# Patient Record
Sex: MALE | URBAN - METROPOLITAN AREA
[De-identification: names, ages, dates, MRNs, and addresses within clinical notes are randomized per-mention and may not be internally consistent; named-entity substitution may affect disease eponyms.]

---

## 2021-04-15 ENCOUNTER — HISTORICAL (OUTPATIENT)
Dept: ADMINISTRATIVE | Facility: HOSPITAL | Age: 46
End: 2021-04-15

## 2021-05-11 ENCOUNTER — HISTORICAL (OUTPATIENT)
Dept: ADMINISTRATIVE | Facility: HOSPITAL | Age: 46
End: 2021-05-11

## 2021-06-15 ENCOUNTER — HISTORICAL (OUTPATIENT)
Dept: ADMINISTRATIVE | Facility: HOSPITAL | Age: 46
End: 2021-06-15

## 2021-07-13 ENCOUNTER — HISTORICAL (OUTPATIENT)
Dept: ADMINISTRATIVE | Facility: HOSPITAL | Age: 46
End: 2021-07-13

## 2021-08-03 ENCOUNTER — HISTORICAL (OUTPATIENT)
Dept: ADMINISTRATIVE | Facility: HOSPITAL | Age: 46
End: 2021-08-03

## 2021-08-03 LAB — SARS-COV-2 RNA RESP QL NAA+PROBE: POSITIVE

## 2021-08-10 ENCOUNTER — HISTORICAL (OUTPATIENT)
Dept: INFECTIOUS DISEASES | Facility: HOSPITAL | Age: 46
End: 2021-08-10

## 2021-08-17 ENCOUNTER — HISTORICAL (OUTPATIENT)
Dept: ADMINISTRATIVE | Facility: HOSPITAL | Age: 46
End: 2021-08-17

## 2021-09-16 ENCOUNTER — HISTORICAL (OUTPATIENT)
Dept: LAB | Facility: HOSPITAL | Age: 46
End: 2021-09-16

## 2021-09-16 LAB
ABS NEUT (OLG): 4.37 X10(3)/MCL (ref 2.1–9.2)
ALBUMIN SERPL-MCNC: 3.6 GM/DL (ref 3.5–5)
ALBUMIN/GLOB SERPL: 0.9 RATIO (ref 1.1–2)
ALP SERPL-CCNC: 101 UNIT/L (ref 40–150)
ALT SERPL-CCNC: 25 UNIT/L (ref 0–55)
APPEARANCE, UA: CLEAR
AST SERPL-CCNC: 18 UNIT/L (ref 5–34)
BASOPHILS # BLD AUTO: 0.07 X10(3)/MCL (ref 0–0.2)
BASOPHILS NFR BLD AUTO: 0.9 % (ref 0–0.9)
BILIRUB SERPL-MCNC: 0.4 MG/DL (ref 0.2–1.2)
BILIRUB UR QL STRIP: NEGATIVE
BILIRUBIN DIRECT+TOT PNL SERPL-MCNC: 0.2 MG/DL (ref 0–0.5)
BILIRUBIN DIRECT+TOT PNL SERPL-MCNC: 0.2 MG/DL (ref 0–0.8)
BUN SERPL-MCNC: 16.8 MG/DL (ref 8.9–20.6)
CALCIUM SERPL-MCNC: 9.4 MG/DL (ref 8.4–10.2)
CHLORIDE SERPL-SCNC: 104 MMOL/L (ref 98–107)
CO2 SERPL-SCNC: 29 MMOL/L (ref 22–29)
COLOR UR: YELLOW
CREAT SERPL-MCNC: 0.76 MG/DL (ref 0.72–1.25)
EOSINOPHIL # BLD AUTO: 0.2 X10(3)/MCL (ref 0–0.9)
EOSINOPHIL NFR BLD AUTO: 2.5 % (ref 0–6.5)
ERYTHROCYTE [DISTWIDTH] IN BLOOD BY AUTOMATED COUNT: 12.6 % (ref 11.5–17)
EST. AVERAGE GLUCOSE BLD GHB EST-MCNC: 125.5 MG/DL
GLOBULIN SER-MCNC: 3.9 GM/DL (ref 2.4–3.5)
GLUCOSE (UA): NEGATIVE
GLUCOSE SERPL-MCNC: 124 MG/DL (ref 74–100)
HBA1C MFR BLD: 6 %
HCT VFR BLD AUTO: 37 % (ref 42–52)
HGB BLD-MCNC: 13.7 GM/DL (ref 14–18)
HGB UR QL STRIP: NEGATIVE
IMM GRANULOCYTES # BLD AUTO: 0.03 10*3/UL (ref 0–0.02)
IMM GRANULOCYTES NFR BLD AUTO: 0.4 % (ref 0–0.43)
KETONES UR QL STRIP: NEGATIVE
LEUKOCYTE ESTERASE UR QL STRIP: NEGATIVE
LYMPHOCYTES # BLD AUTO: 2.85 X10(3)/MCL (ref 0.6–4.6)
LYMPHOCYTES NFR BLD AUTO: 35.5 % (ref 16.2–38.3)
MCH RBC QN AUTO: 33.7 PG (ref 27–31)
MCHC RBC AUTO-ENTMCNC: 37 GM/DL (ref 33–36)
MCV RBC AUTO: 90.9 FL (ref 80–94)
MONOCYTES # BLD AUTO: 0.51 X10(3)/MCL (ref 0.1–1.3)
MONOCYTES NFR BLD AUTO: 6.4 % (ref 4.7–11.3)
MRSA SCREEN BY PCR: NEGATIVE
NEUTROPHILS # BLD AUTO: 4.37 X10(3)/MCL (ref 2.1–9.2)
NEUTROPHILS NFR BLD AUTO: 54.3 % (ref 49.1–73.4)
NITRITE UR QL STRIP: NEGATIVE
NRBC BLD AUTO-RTO: 0 % (ref 0–0.2)
PH UR STRIP: 6 [PH] (ref 5–7)
PLATELET # BLD AUTO: 221 X10(3)/MCL (ref 130–400)
PMV BLD AUTO: 10.3 FL (ref 7.4–10.4)
POTASSIUM SERPL-SCNC: 4.1 MMOL/L (ref 3.5–5.1)
PROT SERPL-MCNC: 7.5 GM/DL (ref 6.4–8.3)
PROT UR QL STRIP: NEGATIVE
RBC # BLD AUTO: 4.07 X10(6)/MCL (ref 4.7–6.1)
SODIUM SERPL-SCNC: 141 MMOL/L (ref 136–145)
SP GR UR STRIP: >=1.03 (ref 1–1.03)
UROBILINOGEN UR STRIP-ACNC: ABNORMAL
WBC # SPEC AUTO: 8 X10(3)/MCL (ref 4.5–11.5)

## 2021-09-21 ENCOUNTER — HISTORICAL (OUTPATIENT)
Dept: LAB | Facility: HOSPITAL | Age: 46
End: 2021-09-21

## 2021-09-21 LAB — SARS-COV-2 AG RESP QL IA.RAPID: NEGATIVE

## 2021-10-07 ENCOUNTER — HISTORICAL (OUTPATIENT)
Dept: ADMINISTRATIVE | Facility: HOSPITAL | Age: 46
End: 2021-10-07

## 2021-11-04 ENCOUNTER — HISTORICAL (OUTPATIENT)
Dept: ADMINISTRATIVE | Facility: HOSPITAL | Age: 46
End: 2021-11-04

## 2022-04-10 ENCOUNTER — HISTORICAL (OUTPATIENT)
Dept: ADMINISTRATIVE | Facility: HOSPITAL | Age: 47
End: 2022-04-10
Payer: COMMERCIAL

## 2022-04-24 VITALS
BODY MASS INDEX: 42.13 KG/M2 | DIASTOLIC BLOOD PRESSURE: 96 MMHG | OXYGEN SATURATION: 99 % | WEIGHT: 278 LBS | HEIGHT: 68 IN | SYSTOLIC BLOOD PRESSURE: 159 MMHG

## 2022-05-03 NOTE — HISTORICAL [facility]ER
This is a historical note converted from Gracie. Formatting and pictures may have been removed.  Please reference Gracie for original formatting and attached multimedia. Chief Complaint  Patient presents for POST OP left total hip 9/22/21-12/21/21. Patient denies having any pain at this time. Overall doing well. States doing therapy at home on his own.  History of Present Illness  46-year-old male presented to the clinic today 2 weeks status post a left total hip arthroplasty. ?Overall the patient is doing very well. ?The patient is ambulating without any assistance.? He denies any pain.? He states he does have tramadol at home and is barely using any and is taking Tylenol?as needed.? He would like a refill on her tramadol just to have on standby.? He is doing at home therapy?at this time.? He denies any?drainage or bleeding from the incision site. ?He did have a reaction?to the bandages?in which he finishes antibiotics that were sent in?to help calm this down.? The redness and irritation has improved?with this.  Review of Systems  Denies fevers, chills, chest pain, shortness of breath. Comprehensive review of systems performed and otherwise negative except as noted in HPI  Physical Exam  Vitals & Measurements  T:?98.1? ?F (Oral)? HR:?96(Peripheral)? BP:?132/81?  HT:?172.00?cm? WT:?126.100?kg? BMI:?42.62?  General: awake and alert, no acute distress, healthy appearing  ?Head and Neck: Head atraumatic/normocephalic. Moist MM  ?CV: brisk cap refill  ?Lungs: non-labored breathing, w/o cough or SOB  ?Skin: no rashes present, warm to touch  ?Neuro: sensation grossly intact distally  ?  Left hip exam:  Left hip incision clean dry and intact. No erythema, drainage, or signs of infection  ?No swelling distally. No signs of DVT  Brisk cap refill right ?foot  ?Sensation intact distally to right foot  ?Positive FHL/EHL/gastrocsoleus/tib ant  Assessment/Plan  1.?Aftercare following hip joint replacement surgery?Z47.1  ?Patient  presents to clinic today 2-week status post left total hip arthroplasty. ?Overall the patient is doing very well. ?His hip is stable upon examination and x-rays today here in clinic.? A prescription for tramadol was sent in today?to his pharmacy.? He was educated that if he needs more medication before his next appointment to call.? His staples were also removed today here in clinic where his incision site with?well-healed.? He was educated that he can now shower without vigorously scrubbing the incision site, and also to avoid fully submerging for the next few days.? We will have him follow-up in clinic in 1 month with x-rays to assess the status of his hip.  Ordered:  Clinic Follow up, *Est. 11/07/21 3:00:00 CST, Order for future visit, [facility]  Post-Op follow-up visit [id number] PC, Aftercare following hip joint replacement surgery, [facility], 10/07/21 11:02:00 CDT  XR Hip Left 2 Views, Routine, *Est. 11/07/21 3:00:00 CST, None, Ambulatory, Rad Type, Order for future visit, Aftercare following hip joint replacement surgery, Not Scheduled, *Est. 11/07/21 3:00:00 CST  ?  Orders:  traMADol, 50 mg = 1 tab(s), Oral, q6hr, PRN PRN pain, # 28 tab(s), 0 Refill(s), Pharmacy: [id number], 172, cm, Height/Length Dosing, 10/07/21 10:32:00 CDT, 126.1, kg, Weight Dosing, 10/07/21 10:32:00 CDT  The above findings, diagnostics, and treatment plan were discussed with Dr. Isaias Burgos who is in agreement with the plan of care.  Referrals  Clinic Follow up, *Est. 11/07/21 3:00:00 CST, Order for future visit, [facility]   Problem List/Past Medical History  Ongoing  Diabetes mellitus  Hypertension  Hypothyroid  Morbid obesity  Primary osteoarthritis of right hip  Historical  No qualifying data  Procedure/Surgical History  SHELBIE FERRARI Total Hip Arthroplasty (Left) (09/22/2021)  Replacement of Left Hip Joint  with Synthetic Substitute, Cemented, Open Approach (09/22/2021)  SHELBIE FERRARI Total Hip Arthroplasty (Right) (05/26/2021)  Replacement of Right Hip Joint with Synthetic Substitute, Cemented, Open Approach (05/26/2021)   Medications  Adderall 30 mg oral tablet, 30 mg= 1 tab(s), Oral, BID,? ?Still taking, not as prescribed: takes as needed, for weight loss  albuterol CFC free 90 mcg/inh inhalation aerosol with adapter, 2 puff(s), INH, q6hr,? ?Not taking  AMLODIPINE BESYLATE 5MG TABLET, 5 mg= 1 tab(s), Oral, At Bedtime  aspirin 81 mg oral Delayed Release (EC) tablet, 81 mg= 1 tab(s), Oral, BID  Bactrim  mg-160 mg oral tablet, 1 tab(s), Oral, BID,? ?Not Taking, Completed Rx  buprenorphine 2 mg sublingual tablet, 1 mg= 0.5 tab(s), SL, TID,? ?Still taking, not as prescribed: PRN  dexamethasone 2 mg oral tablet, 2 mg= 1 tab(s), Oral, qPM,? ?Not taking  dextromethorphan-promethazine 15 mg-6.25 mg/5 mL oral syrup, 5 mL, Oral, q6hr,? ?Not taking  glipiZIDE 5 mg oral tablet, 5 mg= 1 tab(s), Oral, BID  ibuprofen 800 mg oral tablet, 800 mg= 1 tab(s), Oral, BID,? ?Not taking  levothyroxine 200 mcg (0.2 mg) oral tablet, 200 mcg= 1 tab(s), Oral, Daily  lisinopril 20 mg oral tablet, 20 mg= 1 tab(s), Oral, qPM,? ?Not taking  lisinopril 30 mg oral tablet, 30 mg= 1 tab(s), Oral, qPM  METOPROLOL SUCCINATE ER 50MG TABLET EXTENDED RELEASE 24 HOUR, 50 mg= 1 tab(s), Oral, At Bedtime  Neurontin 300 mg oral capsule, 300 mg= 1 cap(s), Oral, BID  nystatin 100,000 units/mL oral suspension, 911269 units, Oral, QID,? ?Not taking  ondansetron 4 mg oral tablet, disintegrating, 4 mg= 1 tab(s), Oral, q8hr,? ?Not taking  polyethylene glycol 3350 oral powder for reconstitution, 17 gm= 17 gm, TOP, Daily,? ?Not taking  Robaxin 750 mg oral tablet, 750 mg= 1 tab(s), Oral, q6hr, PRN,? ?Not taking  Toradol 10 mg oral tablet, 10 mg= 1 tab(s), Oral, q8hr,? ?Not taking  traMADol 50 mg oral tablet, 50 mg= 1 tab(s), Oral, q4hr,? ?Still taking, not as prescribed:  PRN  Tylenol, 500 mg= 1 tab(s), Oral, q4hr,? ?Not taking  Allergies  Adhesive Bandage?(Burning of skin)  No Known Medication Allergies  Social History  Abuse/Neglect  No, No, Yes, 10/07/2021  Alcohol  Never, 10/07/2021  Employment/School  Employed, 09/22/2021  Home/Environment  Lives with Spouse., 09/22/2021  Nutrition/Health  Diabetic, 09/22/2021  Substance Use  Never, 10/07/2021  Tobacco  Former smoker, quit more than 30 days ago, No, 10/07/2021  Family History  Cancer: Mother.  Health Maintenance  Health Maintenance  ???Pending?(in the next year)  ??? ??Due?  ??? ? ? ?Diabetes Maintenance-Eye Exam due??10/07/21??Unknown Frequency  ??? ? ? ?Diabetes Maintenance-Fasting Lipid Profile due??10/07/21??Variable frequency  ??? ? ? ?Diabetes Maintenance-Foot Exam due??10/07/21??Unknown Frequency  ??? ? ? ?Hypertension Management-Education due??10/07/21??and every 1??year(s)  ??? ? ? ?Lipid Screening due??10/07/21??Unknown Frequency  ??? ? ? ?Tetanus Vaccine due??10/07/21??and every 10??year(s)  ??? ??Due In Future?  ??? ? ? ?Obesity Screening not due until??01/01/22??and every 1??year(s)  ??? ? ? ?Alcohol Misuse Screening not due until??01/02/22??and every 1??year(s)  ??? ? ? ?ADL Screening not due until??06/15/22??and every 1??year(s)  ??? ? ? ?Diabetes Maintenance-HgbA1c not due until??09/16/22??and every 1??year(s)  ??? ? ? ?Hypertension Management-BMP not due until??09/23/22??and every 1??year(s)  ??? ? ? ?Diabetes Maintenance-Serum Creatinine not due until??09/23/22??and every 1??year(s)  ??? ? ? ?Aspirin Therapy for CVD Prevention not due until??09/23/22??and every 1??year(s)  ???Satisfied?(in the past 1 year)  ??? ??Satisfied?  ??? ? ? ?ADL Screening on??06/15/21.??Satisfied by Antonio Christopher  ??? ? ? ?Alcohol Misuse Screening on??06/15/21.??Satisfied by Antonio Christopher  ??? ? ? ?Aspirin Therapy for CVD Prevention on??09/23/21.??Satisfied by Violette Earl NP  ??? ? ? ?Blood Pressure Screening  on??10/07/21.??Satisfied by Maribel Mayfield  ??? ? ? ?Body Mass Index Check on??10/07/21.??Satisfied by Maribel Mayfield  ??? ? ? ?Depression Screening on??10/07/21.??Satisfied by Maribel Mayfield  ??? ? ? ?Diabetes Maintenance-Serum Creatinine on??09/23/21.??Satisfied by Rachel Mattson  ??? ? ? ?Diabetes Screening on??09/23/21.??Satisfied by Rachel Mattson  ??? ? ? ?Hypertension Management-Blood Pressure on??10/07/21.??Satisfied by Maribel Mayfield  ??? ? ? ?Influenza Vaccine on??10/07/21.??Satisfied by Maribel Mayfield  ??? ? ? ?Obesity Screening on??10/07/21.??Satisfied by Maribel Mayfield  ?  Diagnostic Results  AP &?lateral?left hip?reviewed. ?Patients implants well fixed with no signs of loosening or subsidence noted.

## 2022-05-03 NOTE — HISTORICAL [facility]
This is a historical note converted from Gracie. Formatting and pictures may have been removed.  Please reference Gracie for original formatting and attached multimedia. Chief Complaint  Right side hip pain,no surgery. pt states he is also having siatica pain on left side. 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  History of Present Illness  45-year-old male presents today for evaluation of right hip pain. ?Patient with?a history of hip pain for the last 10 to 15 years. ?He was told about 10 years ago the need hip replacement.? He has taken anti-inflammatories for this?routinely. ?He uses a walker and a cane for ambulation. ?Notes pain to his posterior buttock. ?Denies any groin pain. ?Is worse with any ambulation. ?Also with longstanding history of left hip pain as well.? He feels as though is reached a point disability guards to the right hip?and like to proceed with surgical intervention.  Review of Systems  Denies fevers, chills, chest pain, shortness of breath. Comprehensive review of systems performed and otherwise negative except as noted in HPI.  Physical Exam  Vitals & Measurements  T:?96.2? ?F (Oral)? HR:?87(Peripheral)? BP:?146/90?  HT:?176.00?cm? WT:?127.000?kg? BMI:?41?  General: No acute distress, alert and oriented, healthy appearing?  HEENT: Head is atraumatic, mucous membranes are moist?  Cardiovascular: Brisk capillary refill  Lungs: Breathing non-labored?  Skin: no rashes appreciated?  Neurologic: Sensation is grossly intact distally  ?  Right hip:  Patient with a fixed?external rotation deformity. ?He cannot rotate to neutral. ?He does have about?30 degrees of external rotation. ?Internal rotation is -10. ?Hip flexion?to 100. ?Further limited by pain. ?He has a positive [facility] recreation of his buttock pain. ?He walks with a severely antalgic gait.  Assessment/Plan  1.?Primary osteoarthritis of right hip?M16.11  At this point the patient is tried and failed all conservative management with  regards to their right hip. ?They have tried and failed nonoperative management including: Anti-inflammatories, activity modification, and assistive devices. All of these have failed to completely remove their pain. They have pain putting on shoes and socks, getting in and out of a car, as well as walking on level ground. Their hip pain is affecting activities of daily living They feel that theyve reached a point of disability with regards to their hip. X-rays reveal advanced, end-stage degenerative osteoarthritis as noted by subchondral sclerosis, joint space narrowing, and periarticular osteophytes. The patient would like to proceed with surgical intervention and would be a good candidate for total hip replacement.  Total hip arthroplasty procedure, alternatives, risks, and benefits were discussed in detail. The risks including but not limited to: infection, need for revision surgery, pain, swelling, loosening, injury to surrounding neurovascular structures, stiffness, incomplete resolution of pain, limb length discrepancy, DVT, PE, and death were discussed in detail. Despite these risks, the patient would like to proceed with surgical intervention. All questions were answered, no guarantees made. Will plan for right TRUONG on mid May.  Ordered:  CT Ext Lower Robotic Right, Routine, 04/15/21 16:38:00 CDT, Other (please specify), None, Ambulatory, pre-op planning right TRUONG, Rad Type, Order for future visit, Primary osteoarthritis of right hip, Schedule this test, [facility], 04/15/21 16:38:00 CDT  Office/Outpatient Visit Level 4 New [id number] PC, Primary osteoarthritis of right hip, [facility], 04/15/21 16:34:00 CDT  Office/Outpatient Visit Level 4 New [id number] PC, Primary osteoarthritis of right hip  Right hip pain, [facility], 04/15/21 16:20:00 CDT  ?  Referrals  Clinic Follow up, Order for future visit   Problem List/Past Medical History  Ongoing  Hypertension  Morbid  obesity  Historical  No qualifying data  Medications  AMLODIPINE BESYLATE 5MG TABLET, 5 mg= 1 tab(s), Oral, Daily  CELECOXIB 100MG CAPSULE  LISINOPRIL 20MG TABLET, 20 mg= 1 tab(s), Oral, Daily  METHOCARBAMOL 750MG TABLET  METOPROLOL SUCCINATE ER 50MG TABLET EXTENDED RELEASE 24 HOUR, 50 mg= 1 tab(s), Oral, Daily  Allergies  No Known Medication Allergies  Social History  Abuse/Neglect  No, No, Yes, 04/15/2021  Tobacco  Never (less than 100 in lifetime), N/A, 04/15/2021  Health Maintenance  Health Maintenance  ???Pending?(in the next year)  ??? ??OverDue  ??? ? ? ?Alcohol Misuse Screening due??01/02/21??and every 1??year(s)  ??? ??Due?  ??? ? ? ?ADL Screening due??04/15/21??and every 1??year(s)  ??? ? ? ?Aspirin Therapy for CVD Prevention due??04/15/21??and every 1??year(s)  ??? ? ? ?Hypertension Management-Education due??04/15/21??and every 1??year(s)  ??? ? ? ?Tetanus Vaccine due??04/15/21??and every 10??year(s)  ??? ??Due In Future?  ??? ? ? ?Obesity Screening not due until??01/01/22??and every 1??year(s)  ???Satisfied?(in the past 1 year)  ??? ??Satisfied?  ??? ? ? ?Blood Pressure Screening on??04/15/21.??Satisfied by Stephanie Rees  ??? ? ? ?Body Mass Index Check on??04/15/21.??Satisfied by Stephanie Rees  ??? ? ? ?Depression Screening on??04/15/21.??Satisfied by Stephanie Rees  ??? ? ? ?Hypertension Management-Blood Pressure on??04/15/21.??Satisfied by Stephanie Rees  ??? ? ? ?Obesity Screening on??04/15/21.??Satisfied by Stephanie Rees  ?  Diagnostic Results  AP lateral hip reviewed. ?Patient with end-stage arthritis with loss of joint space and bone of articulation to right as well as left hip. ?Patient has?complete obliteration of the right hip joint with bone loss of his femoral head.

## 2022-05-03 NOTE — HISTORICAL [facility] CERNER
This is a historical note converted from Gracie. Formatting and pictures may have been removed.  Please reference Gracie for original formatting and attached multimedia. Chief Complaint  1m f/u R TRUONG 5/26/21, pt states his hip is doing well, denies pain, no changes from last visit  History of Present Illness  46-year-old male presents to clinic today?6 weeks status post right total hip arthroplasty. ?Overall he is doing very well and ambulating?any assistance. ?He denies any pain in the right hip.? He is done with physical therapy.? He has no complications to the right hip at this time.? Regards to the left hip he does have significant pain?with ambulation.? His pain is more located in the groin area.? He does note that x-rays have shown?end-stage arthritis to the left hip.? He has tried?over-the-counter anti-inflammatories, Tylenol, and lifestyle modifications.? He does feel as though he is reached a point of disability to wear?he would like to proceed with a left total hip arthroplasty.  Review of Systems  Denies fevers, chills, chest pain, shortness of breath. Comprehensive review of systems performed and otherwise negative except as noted in HPI  Physical Exam  Vitals & Measurements  T:?36.6? ?C (Oral)? HR:?97(Peripheral)? BP:?152/94?  HT:?172.00?cm? WT:?124.400?kg? BMI:?42.05?  General: awake and alert, no acute distress, healthy appearing  ?Head and Neck: Head atraumatic/normocephalic. Moist MM  ?CV: brisk cap refill  ?Lungs: non-labored breathing, w/o cough or SOB  ?Skin: no rashes present, warm to touch  ?Neuro: sensation grossly intact distally  ?  Right hip exam:  Right hip incision clean dry and intact. No erythema, drainage, or signs of infection  ?No swelling distally. No signs of DVT  Brisk cap refill right ?foot  ?Sensation intact distally to right foot  ?Positive FHL/EHL/gastrocsoleus/tib ant?  ?  Focused Orthopedic Exam:  Left hip without wound or skin breakdown.?  No tenderness to palpation about  the greater trochanter and gluteus?  ?Flexion to 50. ?Internal Rotation to 0. ?External Rotation to 15. ?Significant groin pain with range of motion  Positive Stinchfield  Positive antalgic gait  ?  Assessment/Plan  1.?Aftercare following joint replacement?Z47.1  ?Patient 6 weeks status post right total hip arthroplasty.? Overall he is doing excellent and has no pain or complications to the right hip. ?His hip is stable upon examination and x-rays here today in clinic.  2.?Primary osteoarthritis of left hip?M16.12  ?  At this point the patient is tried and failed all conservative management with regards to their left hip. ?They have tried and failed nonoperative management including: Anti-inflammatories, activity modification, and assistive devices. All of these have failed to completely remove their pain. They have pain putting on shoes and socks, getting in and out of a car, as well as walking on level ground. Their hip pain is affecting activities of daily living They feel that theyve reached a point of disability with regards to their hip. X-rays reveal advanced, end-stage degenerative osteoarthritis as noted by subchondral sclerosis, joint space narrowing, and periarticular osteophytes. The patient would like to proceed with surgical intervention and would be a good candidate for total hip replacement. ?We will have him pick a date today here in clinic, and have him follow-up for a preoperative visit.   Problem List/Past Medical History  Ongoing  Diabetes mellitus  Hypertension  Morbid obesity  Primary osteoarthritis of right hip  Historical  No qualifying data  Procedure/Surgical History  SHELBIEART FERRARI Total Hip Arthroplasty (Right) (05/26/2021)  Replacement of Right Hip Joint with Synthetic Substitute, Cemented, Open Approach (05/26/2021)   Medications  Adderall 30 mg oral tablet, 30 mg= 1 tab(s), Oral, BID  AMLODIPINE BESYLATE 5MG TABLET, 5 mg= 1 tab(s), Oral, Daily  aspirin 81 mg oral Delayed Release (EC)  tablet, 81 mg= 1 tab(s), Oral, BID  buprenorphine 2 mg sublingual tablet, 1 mg= 0.5 tab(s), SL, TID  glipiZIDE 5 mg oral tablet, 5 mg= 1 tab(s), Oral, BID  levothyroxine 200 mcg (0.2 mg) oral tablet, 200 mcg= 1 tab(s), Oral, Daily  LISINOPRIL 20MG TABLET, 20 mg= 1 tab(s), Oral, Daily  METOPROLOL SUCCINATE ER 50MG TABLET EXTENDED RELEASE 24 HOUR, 50 mg= 1 tab(s), Oral, Daily  Neurontin 300 mg oral capsule, 300 mg= 1 cap(s), Oral, BID  Tylenol, 500 mg= 1 tab(s), Oral, q4hr  Allergies  No Known Medication Allergies  Social History  Abuse/Neglect  No, No, Yes, 07/13/2021  Alcohol  Past, 05/11/2021  Substance Use  Never, 05/11/2021  Tobacco  Former smoker, quit more than 30 days ago, No, 07/13/2021  Family History  Family history is negative  Health Maintenance  Health Maintenance  ???Pending?(in the next year)  ??? ??OverDue  ??? ? ? ?Influenza Vaccine due??10/01/20??and every 1??day(s)  ??? ??Due?  ??? ? ? ?Diabetes Maintenance-Eye Exam due??07/13/21??Unknown Frequency  ??? ? ? ?Diabetes Maintenance-Fasting Lipid Profile due??07/13/21??Variable frequency  ??? ? ? ?Diabetes Maintenance-Foot Exam due??07/13/21??Unknown Frequency  ??? ? ? ?Diabetes Maintenance-HgbA1c due??07/13/21??Unknown Frequency  ??? ? ? ?Hypertension Management-Education due??07/13/21??and every 1??year(s)  ??? ? ? ?Lipid Screening due??07/13/21??Unknown Frequency  ??? ? ? ?Tetanus Vaccine due??07/13/21??and every 10??year(s)  ??? ??Due In Future?  ??? ? ? ?Obesity Screening not due until??01/01/22??and every 1??year(s)  ??? ? ? ?Alcohol Misuse Screening not due until??01/02/22??and every 1??year(s)  ??? ? ? ?Hypertension Management-BMP not due until??05/27/22??and every 1??year(s)  ??? ? ? ?Diabetes Maintenance-Serum Creatinine not due until??05/27/22??and every 1??year(s)  ??? ? ? ?Aspirin Therapy for CVD Prevention not due until??05/27/22??and every 1??year(s)  ??? ? ? ?ADL Screening not due until??06/15/22??and every 1??year(s)  ???Satisfied?(in  the past 1 year)  ??? ??Satisfied?  ??? ? ? ?ADL Screening on??06/15/21.??Satisfied by Antonio Christopher  ??? ? ? ?Alcohol Misuse Screening on??06/15/21.??Satisfied by Antonio Christopher  ??? ? ? ?Aspirin Therapy for CVD Prevention on??05/27/21.??Satisfied by Violette Earl NP  ??? ? ? ?Blood Pressure Screening on??07/13/21.??Satisfied by Antonio Christopher  ??? ? ? ?Body Mass Index Check on??07/13/21.??Satisfied by Antonio Christopher  ??? ? ? ?Depression Screening on??07/13/21.??Satisfied by Antonio Christopher  ??? ? ? ?Diabetes Maintenance-Serum Creatinine on??05/27/21.??Satisfied by Chaparro Jean  ??? ? ? ?Diabetes Screening on??05/27/21.??Satisfied by Chaparro Jean  ??? ? ? ?Hypertension Management-Blood Pressure on??07/13/21.??Satisfied by Antonio Christopher  ??? ? ? ?Obesity Screening on??07/13/21.??Satisfied by Antonio Christopher  ?  Diagnostic Results  AP lateral?right hip?reviewed. ?Patients implants appear well fixed. ?No signs of loosening or subsidence noted.

## 2022-05-03 NOTE — HISTORICAL [facility] CERNER
This is a historical note converted from Gracie. Formatting and pictures may have been removed.  Please reference Gracie for original formatting and attached multimedia. Chief Complaint  Patient presents for Pre Op Right total hip 5/26/21. Patient admits to having a constant achy/sharp pain. States movement make pain intensify. Taking buprenorphine given to him by Dr Madrigal. Pain scale is a 10/10 right now due to walking.  History of Present Illness  45-year-old male presents here with follow-up of?right?hip osteoarthritis. ?Patient ingested pain in the right hip. ?He is trying for long-term medical and anti-inflammatories activity modifications will use a cane.? Patient feels he would respond to smooth guards the right hip and like to proceed with surgical invention.? Is on chronic pain medication from his pain management physician.  Review of Systems  Denies fevers, chills, chest pain, shortness of breath. Comprehensive review of systems performed and otherwise negative except as noted in HPI.  Physical Exam  Vitals & Measurements  T:?98.2? ?F (Oral)? HR:?98(Peripheral)? BP:?123/80?  HT:?176.00?cm? WT:?127.000?kg? BMI:?41?  General: No acute distress, alert and oriented, healthy appearing?  HEENT: Head is atraumatic, mucous membranes are moist?  Cardiovascular: Brisk capillary refill  Lungs: Breathing non-labored?  Skin: no rashes appreciated?  Neurologic: Sensation is grossly intact distally  ?  Right hip:  Patient significant pain with range of motion. ?He walks an antalgic gait. ?Positive Stinchfield. ?Brisk cap refill distally.  Assessment/Plan  1.?Primary osteoarthritis of right hip?M16.11  At this point the patient is tried and failed all conservative management with regards to their right hip. ?They have tried and failed nonoperative management including: Anti-inflammatories, activity modification, and assistive devices. All of these have failed to completely remove their pain. They have pain putting on  shoes and socks, getting in and out of a car, as well as walking on level ground. Their hip pain is affecting activities of daily living They feel that theyve reached a point of disability with regards to their hip. X-rays reveal advanced, end-stage degenerative osteoarthritis as noted by subchondral sclerosis, joint space narrowing, and periarticular osteophytes. The patient would like to proceed with surgical intervention and would be a good candidate for total hip replacement.  Total hip arthroplasty procedure, alternatives, risks, and benefits were discussed in detail. The risks including but not limited to: infection, need for revision surgery, pain, swelling, loosening, injury to surrounding neurovascular structures, stiffness, incomplete resolution of pain, limb length discrepancy, DVT, PE, and death were discussed in detail. Despite these risks, the patient would like to proceed with surgical intervention. All questions were answered, no guarantees made. Will plan for right TRUONG on 5/26/2021.?  Patient does have a history of chronic narcotic use.? Expect the patient to take greater than 48 hours?to appropriately manage his pain with multimodal pain medication.? Will perform surgery as an inpatient?due to this.  Ordered:  XR Spine Lumbar 2 or 3 Views, Routine, 05/11/21 9:33:00 CDT, None, Ambulatory, Rad Type, Primary osteoarthritis of right hip, Not Scheduled, 05/11/21 9:33:00 CDT  ?   Problem List/Past Medical History  Ongoing  Hypertension  Morbid obesity  Primary osteoarthritis of right hip  Historical  No qualifying data  Medications  Adderall 30 mg oral tablet, 30 mg= 1 tab(s), Oral, BID  AMLODIPINE BESYLATE 5MG TABLET, 5 mg= 1 tab(s), Oral, Daily  buprenorphine 2 mg sublingual tablet, 1 mg= 0.5 tab(s), SL, TID  CELECOXIB 100MG CAPSULE,? ?Not taking  glipiZIDE 5 mg oral tablet, 5 mg= 1 tab(s), Oral, BID  ibuprofen 800 mg oral tablet, 800 mg= 1 tab(s), Oral, BID  levothyroxine 200 mcg (0.2 mg) oral tablet,  200 mcg= 1 tab(s), Oral, Daily  LISINOPRIL 20MG TABLET, 20 mg= 1 tab(s), Oral, Daily  METHOCARBAMOL 750MG TABLET  METOPROLOL SUCCINATE ER 50MG TABLET EXTENDED RELEASE 24 HOUR, 50 mg= 1 tab(s), Oral, Daily  Allergies  No Known Medication Allergies  Social History  Abuse/Neglect  No, No, Yes, 04/15/2021  Alcohol  Past, 05/11/2021  Substance Use  Never, 05/11/2021  Tobacco  Former smoker, quit more than 30 days ago, No, 05/11/2021  Family History  Family history is negative  Health Maintenance  Health Maintenance  ???Pending?(in the next year)  ??? ??OverDue  ??? ? ? ?Influenza Vaccine due??10/01/20??and every 1??day(s)  ??? ? ? ?Alcohol Misuse Screening due??01/02/21??and every 1??year(s)  ??? ??Due?  ??? ? ? ?ADL Screening due??05/11/21??and every 1??year(s)  ??? ? ? ?Aspirin Therapy for CVD Prevention due??05/11/21??and every 1??year(s)  ??? ? ? ?Diabetes Maintenance-Eye Exam due??05/11/21??Unknown Frequency  ??? ? ? ?Diabetes Maintenance-Foot Exam due??05/11/21??Unknown Frequency  ??? ? ? ?Diabetes Maintenance-HgbA1c due??05/11/21??Unknown Frequency  ??? ? ? ?Diabetes Screening due??05/11/21??Unknown Frequency  ??? ? ? ?Hypertension Management-BMP due??05/11/21??Unknown Frequency  ??? ? ? ?Hypertension Management-Education due??05/11/21??and every 1??year(s)  ??? ? ? ?Lipid Screening due??05/11/21??Unknown Frequency  ??? ? ? ?Tetanus Vaccine due??05/11/21??and every 10??year(s)  ??? ??Due In Future?  ??? ? ? ?Obesity Screening not due until??01/01/22??and every 1??year(s)  ???Satisfied?(in the past 1 year)  ??? ??Satisfied?  ??? ? ? ?Blood Pressure Screening on??05/11/21.??Satisfied by Maribel Mayfield  ??? ? ? ?Body Mass Index Check on??05/11/21.??Satisfied by Maribel Mayfield  ??? ? ? ?Depression Screening on??05/11/21.??Satisfied by Maribel Mayfield  ??? ? ? ?Hypertension Management-Blood Pressure on??05/11/21.??Satisfied by Maribel Mayfield  ??? ? ? ?Obesity Screening on??05/11/21.??Satisfied by Maribel Mayfield  CHRISTIANO.  ?

## 2022-05-03 NOTE — HISTORICAL [facility] C[facility]
This is a historical note converted from Gracie. Formatting and pictures may have been removed.  Please reference Gracie for original formatting and attached multimedia. Chief Complaint  2W POSTOP R TRUONG 5/26/21, pt states his hip is doing well, pain level a 2, taking tylenol for pain, xr today  History of Present Illness  46-year-old male presents here for follow-up of total on the right side. ?Patient is?2 weeks out. ?Using a cane. ?Back at work. ?On Tylenol as well as his chronic?pain medication. ?Overall happy with his recovery and feels much relief of his symptoms.  Review of Systems  Denies fevers, chills, chest pain, shortness of breath. Comprehensive review of systems performed and otherwise negative except as noted in HPI.  Physical Exam  Vitals & Measurements  T:?36.7? ?C (Oral)? HR:?91(Peripheral)? BP:?127/76?  HT:?172.00?cm? WT:?124.400?kg? BMI:?42.05?  General: No acute distress, alert and oriented, healthy appearing?  HEENT: Head is atraumatic, mucous membranes are moist?  Cardiovascular: Brisk capillary refill  Lungs: Breathing non-labored?  Skin: no rashes appreciated?  Neurologic: Sensation is grossly intact distally  ?  Hip exam:  Right?hip incision clean dry and intact. No erythema, drainage, or signs of infection  No swelling distally. No signs of DVT  No pain with logroll  Sensation intact distally to right foot  Positive FHL/EHL/gastrocsoleus/tib ant brisk capillary refill right foot  ?  Assessment/Plan  1.?Aftercare following joint replacement?Z47.1  ?Patient overall doing excellent following up arthroplasty. ?We will discontinue staples today. ?Continue physical therapy for gait training range of motion strengthening. ?Plan to see him back in a month.   Problem List/Past Medical History  Ongoing  Diabetes mellitus  Hypertension  Morbid obesity  Primary osteoarthritis of right hip  Historical  No qualifying data  Procedure/Surgical History  [facility] Total Hip Arthroplasty (Right)  (05/26/2021)  Replacement of Right Hip Joint with Synthetic Substitute, Cemented, Open Approach (05/26/2021)   Medications  Adderall 30 mg oral tablet, 30 mg= 1 tab(s), Oral, BID  AMLODIPINE BESYLATE 5MG TABLET, 5 mg= 1 tab(s), Oral, Daily  aspirin 81 mg oral Delayed Release (EC) tablet, 81 mg= 1 tab(s), Oral, BID  buprenorphine 2 mg sublingual tablet, 1 mg= 0.5 tab(s), SL, TID  glipiZIDE 5 mg oral tablet, 5 mg= 1 tab(s), Oral, BID  levothyroxine 200 mcg (0.2 mg) oral tablet, 200 mcg= 1 tab(s), Oral, Daily  LISINOPRIL 20MG TABLET, 20 mg= 1 tab(s), Oral, Daily  METOPROLOL SUCCINATE ER 50MG TABLET EXTENDED RELEASE 24 HOUR, 50 mg= 1 tab(s), Oral, Daily  Neurontin 300 mg oral capsule, 300 mg= 1 cap(s), Oral, BID  Tylenol, 500 mg= 1 tab(s), Oral, q4hr  Allergies  No Known Medication Allergies  Social History  Abuse/Neglect  No, No, Yes, 06/15/2021  Alcohol  Past, 05/11/2021  Substance Use  Never, 05/11/2021  Tobacco  Former smoker, quit more than 30 days ago, No, 06/15/2021  Family History  Family history is negative  Health Maintenance  Health Maintenance  ???Pending?(in the next year)  ??? ??OverDue  ??? ? ? ?Influenza Vaccine due??10/01/20??and every 1??day(s)  ??? ??Due?  ??? ? ? ?Diabetes Maintenance-Eye Exam due??06/15/21??Unknown Frequency  ??? ? ? ?Diabetes Maintenance-Fasting Lipid Profile due??06/15/21??Variable frequency  ??? ? ? ?Diabetes Maintenance-Foot Exam due??06/15/21??Unknown Frequency  ??? ? ? ?Diabetes Maintenance-HgbA1c due??06/15/21??Unknown Frequency  ??? ? ? ?Hypertension Management-Education due??06/15/21??and every 1??year(s)  ??? ? ? ?Lipid Screening due??06/15/21??Unknown Frequency  ??? ? ? ?Tetanus Vaccine due??06/15/21??and every 10??year(s)  ??? ??Due In Future?  ??? ? ? ?Obesity Screening not due until??01/01/22??and every 1??year(s)  ??? ? ? ?Alcohol Misuse Screening not due until??01/02/22??and every 1??year(s)  ??? ? ? ?Hypertension Management-BMP not due until??05/27/22??and every  1??year(s)  ??? ? ? ?Diabetes Maintenance-Serum Creatinine not due until??05/27/22??and every 1??year(s)  ??? ? ? ?Aspirin Therapy for CVD Prevention not due until??05/27/22??and every 1??year(s)  ???Satisfied?(in the past 1 year)  ??? ??Satisfied?  ??? ? ? ?ADL Screening on??06/15/21.??Satisfied by Antonio Christopher  ??? ? ? ?Alcohol Misuse Screening on??06/15/21.??Satisfied by Antonio Christopher  ??? ? ? ?Aspirin Therapy for CVD Prevention on??05/27/21.??Satisfied by Violette Earl NP  ??? ? ? ?Blood Pressure Screening on??06/15/21.??Satisfied by Antonio Christopher  ??? ? ? ?Body Mass Index Check on??06/15/21.??Satisfied by Antonio Christopher  ??? ? ? ?Depression Screening on??06/15/21.??Satisfied by Antonio Christopher  ??? ? ? ?Diabetes Maintenance-Serum Creatinine on??05/27/21.??Satisfied by Chaparro Jean  ??? ? ? ?Diabetes Screening on??05/27/21.??Satisfied by Chaparro Jean  ??? ? ? ?Hypertension Management-Blood Pressure on??06/15/21.??Satisfied by Antonio Christopher  ??? ? ? ?Obesity Screening on??06/15/21.??Satisfied by Antonio Christopher  ?  Diagnostic Results  AP lateral right hip reviewed. ?Patients implants well fixed. ?No signs of loosening or subsidence noted.

## 2022-05-03 NOTE — HISTORICAL [facility]
This is a historical note converted from Gracie. Formatting and pictures may have been removed.  Please reference Gracie for original formatting and attached multimedia. Chief Complaint  6 WEEK F/U LEFT TRUONG, AMBULATING WITHOUT ASSISTANCE, NO COMPLAINTS  History of Present Illness  46-year-old male presented to the clinic today 6 weeks follow-up of the left total hip arthroplasty. ?Overall the patient is doing very well. ?He has no complaints of his hip today here in clinic. ?He is back to doing activities that he was doing before.? He states that walking and putting on shoes and socks is now an easy task, as it was not before.? He does state that he has pain in his left lower back.??He denies any radiation of this pain. ?He states the pain feels like a sharp shooting pain in his back. ?This is worse with?rotation?and flexion. 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 Burgos.? At this time he is currently taking?medications prescribed to him?that have helped?ease his pain, that are prescribed by his primary care provider.  Review of Systems  Denies fevers, chills, chest pain, shortness of breath. Comprehensive review of systems performed and otherwise negative except as noted in HPI  Physical Exam  Vitals & Measurements  HR:?96(Peripheral)? BP:?132/80?  HT:?172.00?cm? WT:?126.100?kg? BMI:?42.62?  General: awake and alert, no acute distress, healthy appearing  ?Head and Neck: Head atraumatic/normocephalic. Moist MM  ?CV: brisk cap refill  ?Lungs: non-labored breathing, w/o cough or SOB  ?Skin: no rashes present, warm to touch  ?Neuro: sensation grossly intact distally  ?  Left hip exam:  Left hip incision clean dry and intact. No erythema, drainage, or signs of infection  ?No swelling distally. No signs of DVT  Brisk cap refill right ?foot  ?Sensation intact distally to right foot  ?Positive FHL/EHL/gastrocsoleus/tib ant  Assessment/Plan  1.?Aftercare following hip joint  replacement surgery?Z47.1  ?Patient is 6 weeks status post left total hip arthroplasty. ?Overall the patient is doing well. ?His hip is stable upon examination and x-rays today here in clinic.? We will have him follow-up in 2 months?to reassess his hip at this time.  ?  In regards to the patients back.? He was educated that if the back continues to bother him or worsens to call for referral to Dr. Barrera.? He states understanding.  The above findings, diagnostics, and treatment plan were discussed with Dr. Isaias Burgos who is in agreement with the plan of care.?   Problem List/Past Medical History  Ongoing  Diabetes mellitus  Hypertension  Hypothyroid  Morbid obesity  Primary osteoarthritis of right hip  Historical  No qualifying data  Procedure/Surgical History  [facility] Total Hip Arthroplasty (Left) (09/22/2021)  Replacement of Left Hip Joint with Synthetic Substitute, Cemented, Open Approach (09/22/2021)  [facility] Total Hip Arthroplasty (Right) (05/26/2021)  Replacement of Right Hip Joint with Synthetic Substitute, Cemented, Open Approach (05/26/2021)   Medications  Adderall 30 mg oral tablet, 30 mg= 1 tab(s), Oral, BID,? ?Still taking, not as prescribed: takes as needed, for weight loss  albuterol CFC free 90 mcg/inh inhalation aerosol with adapter, 2 puff(s), INH, q6hr,? ?Not taking  AMLODIPINE BESYLATE 5MG TABLET, 5 mg= 1 tab(s), Oral, At Bedtime  aspirin 81 mg oral Delayed Release (EC) tablet, 81 mg= 1 tab(s), Oral, BID  buprenorphine 2 mg sublingual tablet, 1 mg= 0.5 tab(s), SL, TID,? ?Still taking, not as prescribed: PRN  dexamethasone 2 mg oral tablet, 2 mg= 1 tab(s), Oral, qPM,? ?Not taking  dextromethorphan-promethazine 15 mg-6.25 mg/5 mL oral syrup, 5 mL, Oral, q6hr,? ?Not taking  glipiZIDE 5 mg oral tablet, 5 mg= 1 tab(s), Oral, BID  ibuprofen 800 mg oral tablet, 800 mg= 1 tab(s), Oral, BID,? ?Not taking  levothyroxine 200 mcg (0.2 mg) oral tablet, 200 mcg= 1 tab(s), Oral, Daily  lisinopril 20 mg  oral tablet, 20 mg= 1 tab(s), Oral, qPM,? ?Not taking  lisinopril 30 mg oral tablet, 30 mg= 1 tab(s), Oral, qPM  METOPROLOL SUCCINATE ER 50MG TABLET EXTENDED RELEASE 24 HOUR, 50 mg= 1 tab(s), Oral, At Bedtime  Neurontin 300 mg oral capsule, 300 mg= 1 cap(s), Oral, BID  nystatin 100,000 units/mL oral suspension, 255068 units, Oral, QID,? ?Not taking  ondansetron 4 mg oral tablet, disintegrating, 4 mg= 1 tab(s), Oral, q8hr,? ?Not taking  polyethylene glycol 3350 oral powder for reconstitution, 17 gm= 17 gm, TOP, Daily,? ?Not taking  Toradol 10 mg oral tablet, 10 mg= 1 tab(s), Oral, q8hr,? ?Not taking  traMADol 50 mg oral tablet, 50 mg= 1 tab(s), Oral, q4hr,? ?Still taking, not as prescribed: PRN  traMADol 50 mg oral tablet, 50 mg= 1 tab(s), Oral, q6hr, PRN,? ?Not Taking, Completed Rx  Tylenol, 500 mg= 1 tab(s), Oral, q4hr,? ?Not taking  Allergies  Adhesive Bandage?(Burning of skin)  No Known Medication Allergies  Social History  Abuse/Neglect  No, No, Yes, 11/04/2021  Alcohol  Never, 10/07/2021  Employment/School  Employed, 09/22/2021  Home/Environment  Lives with Spouse., 09/22/2021  Nutrition/Health  Diabetic, 09/22/2021  Substance Use  Never, 10/07/2021  Tobacco  Former smoker, quit more than 30 days ago, No, 11/04/2021  Family History  Cancer: Mother.  Health Maintenance  Health Maintenance  ???Pending?(in the next year)  ??? ??Due?  ??? ? ? ?Diabetes Maintenance-Eye Exam due??11/04/21??Unknown Frequency  ??? ? ? ?Diabetes Maintenance-Fasting Lipid Profile due??11/04/21??Variable frequency  ??? ? ? ?Diabetes Maintenance-Foot Exam due??11/04/21??Unknown Frequency  ??? ? ? ?Hypertension Management-Education due??11/04/21??and every 1??year(s)  ??? ? ? ?Lipid Screening due??11/04/21??Unknown Frequency  ??? ? ? ?Tetanus Vaccine due??11/04/21??and every 10??year(s)  ??? ??Due In Future?  ??? ? ? ?Obesity Screening not due until??01/01/22??and every 1??year(s)  ??? ? ? ?Alcohol Misuse Screening not due  until??01/02/22??and every 1??year(s)  ??? ? ? ?ADL Screening not due until??06/15/22??and every 1??year(s)  ??? ? ? ?Diabetes Maintenance-HgbA1c not due until??09/16/22??and every 1??year(s)  ??? ? ? ?Hypertension Management-BMP not due until??09/23/22??and every 1??year(s)  ??? ? ? ?Diabetes Maintenance-Serum Creatinine not due until??09/23/22??and every 1??year(s)  ??? ? ? ?Aspirin Therapy for CVD Prevention not due until??09/23/22??and every 1??year(s)  ???Satisfied?(in the past 1 year)  ??? ??Satisfied?  ??? ? ? ?ADL Screening on??06/15/21.??Satisfied by Antonio Christopher  ??? ? ? ?Alcohol Misuse Screening on??06/15/21.??Satisfied by Antonio Christopher  ??? ? ? ?Aspirin Therapy for CVD Prevention on??09/23/21.??Satisfied by Violette Earl NP  ??? ? ? ?Blood Pressure Screening on??11/04/21.??Satisfied by Darshana Gibbons  ??? ? ? ?Body Mass Index Check on??11/04/21.??Satisfied by Darshana Gibbons  ??? ? ? ?Depression Screening on??11/04/21.??Satisfied by Darshana Gibbons  ??? ? ? ?Diabetes Maintenance-Serum Creatinine on??09/23/21.??Satisfied by Rachel Mattson  ??? ? ? ?Diabetes Screening on??09/23/21.??Satisfied by Rachel Mattson  ??? ? ? ?Hypertension Management-Blood Pressure on??11/04/21.??Satisfied by Darshana Gibbons  ??? ? ? ?Influenza Vaccine on??11/04/21.??Satisfied by Darshana Gibbons  ??? ? ? ?Obesity Screening on??11/04/21.??Satisfied by Darshana Gibbons  ?  Diagnostic Results  AP &?lateral?left hip?reviewed. ?Patients implants well fixed with no signs of loosening or subsidence noted.